# Patient Record
Sex: FEMALE | URBAN - METROPOLITAN AREA
[De-identification: names, ages, dates, MRNs, and addresses within clinical notes are randomized per-mention and may not be internally consistent; named-entity substitution may affect disease eponyms.]

---

## 2018-04-13 ENCOUNTER — IMPORTED ENCOUNTER (OUTPATIENT)
Dept: URBAN - METROPOLITAN AREA CLINIC 1 | Facility: CLINIC | Age: 63
End: 2018-04-13

## 2018-04-13 PROBLEM — H35.372: Noted: 2018-04-13

## 2018-04-13 PROBLEM — H25.813: Noted: 2018-04-13

## 2018-04-13 PROBLEM — H43.813: Noted: 2018-04-13

## 2018-04-13 PROCEDURE — 92015 DETERMINE REFRACTIVE STATE: CPT

## 2018-04-13 PROCEDURE — 92014 COMPRE OPH EXAM EST PT 1/>: CPT

## 2018-04-13 NOTE — PATIENT DISCUSSION
1.  Epiretinal Membrane OS- Stable. Appears benign. Observe for change. 2. Cataract OU: Observe for now without intervention. The patient was advised to contact us if any change or worsening of vision3. PVD OU- Old stable. 4. Pt had recent MRI for possible Meneirs dz/ LT sided head ache- results pending. Being evaluated by PCP (Dr. Jorgito Harvey. )5. Return for an appointment for a 27 in 1 year with Dr. Chema Artis.

## 2022-04-03 ASSESSMENT — VISUAL ACUITY
OD_SC: 20/20-1
OS_SC: 20/25

## 2022-04-03 ASSESSMENT — TONOMETRY
OS_IOP_MMHG: 18
OD_IOP_MMHG: 18

## 2023-08-11 ENCOUNTER — EMERGENCY VISIT (OUTPATIENT)
Dept: URBAN - METROPOLITAN AREA CLINIC 2 | Facility: CLINIC | Age: 68
End: 2023-08-11

## 2023-08-11 DIAGNOSIS — H35.372: ICD-10-CM

## 2023-08-11 DIAGNOSIS — G43.109: ICD-10-CM

## 2023-08-11 PROCEDURE — 92012 INTRM OPH EXAM EST PATIENT: CPT

## 2023-08-11 PROCEDURE — 92134 CPTRZ OPH DX IMG PST SGM RTA: CPT

## 2023-08-11 ASSESSMENT — VISUAL ACUITY
OD_CC: 20/20
OS_CC: 20/25

## 2023-08-11 ASSESSMENT — TONOMETRY
OD_IOP_MMHG: 18
OS_IOP_MMHG: 19